# Patient Record
Sex: MALE | Race: WHITE | NOT HISPANIC OR LATINO | Employment: FULL TIME | URBAN - METROPOLITAN AREA
[De-identification: names, ages, dates, MRNs, and addresses within clinical notes are randomized per-mention and may not be internally consistent; named-entity substitution may affect disease eponyms.]

---

## 2023-12-04 PROBLEM — J03.90 ACUTE TONSILLITIS: Status: ACTIVE | Noted: 2023-12-04

## 2023-12-04 PROBLEM — I10 ESSENTIAL HYPERTENSION: Status: ACTIVE | Noted: 2023-12-04

## 2023-12-04 PROBLEM — R53.82 CHRONIC FATIGUE: Status: ACTIVE | Noted: 2023-12-04

## 2023-12-04 PROBLEM — F41.9 ANXIETY: Status: ACTIVE | Noted: 2023-12-04

## 2023-12-04 PROBLEM — J02.9 PHARYNGITIS: Status: ACTIVE | Noted: 2023-12-04

## 2023-12-04 PROBLEM — J45.909 ASTHMA (HHS-HCC): Status: ACTIVE | Noted: 2023-12-04

## 2023-12-04 PROBLEM — D72.819 LEUKOPENIA: Status: ACTIVE | Noted: 2023-12-04

## 2023-12-05 ENCOUNTER — OFFICE VISIT (OUTPATIENT)
Dept: PRIMARY CARE | Facility: CLINIC | Age: 43
End: 2023-12-05
Payer: COMMERCIAL

## 2023-12-05 VITALS
BODY MASS INDEX: 22.5 KG/M2 | OXYGEN SATURATION: 100 % | SYSTOLIC BLOOD PRESSURE: 162 MMHG | HEIGHT: 66 IN | HEART RATE: 97 BPM | WEIGHT: 140 LBS | TEMPERATURE: 97.8 F | DIASTOLIC BLOOD PRESSURE: 96 MMHG

## 2023-12-05 DIAGNOSIS — F41.8 SITUATIONAL ANXIETY: Primary | ICD-10-CM

## 2023-12-05 DIAGNOSIS — R03.0 ELEVATED BP WITHOUT DIAGNOSIS OF HYPERTENSION: ICD-10-CM

## 2023-12-05 PROCEDURE — 3077F SYST BP >= 140 MM HG: CPT | Performed by: INTERNAL MEDICINE

## 2023-12-05 PROCEDURE — 3080F DIAST BP >= 90 MM HG: CPT | Performed by: INTERNAL MEDICINE

## 2023-12-05 PROCEDURE — 1036F TOBACCO NON-USER: CPT | Performed by: INTERNAL MEDICINE

## 2023-12-05 PROCEDURE — 99213 OFFICE O/P EST LOW 20 MIN: CPT | Performed by: INTERNAL MEDICINE

## 2023-12-05 RX ORDER — LORAZEPAM 0.5 MG/1
0.5 TABLET ORAL DAILY PRN
Qty: 10 TABLET | Refills: 0 | Status: SHIPPED | OUTPATIENT
Start: 2023-12-05 | End: 2023-12-05 | Stop reason: SDUPTHER

## 2023-12-05 RX ORDER — BISMUTH SUBSALICYLATE 262 MG
1 TABLET,CHEWABLE ORAL DAILY
COMMUNITY

## 2023-12-05 ASSESSMENT — ENCOUNTER SYMPTOMS
OCCASIONAL FEELINGS OF UNSTEADINESS: 0
DEPRESSION: 0
LOSS OF SENSATION IN FEET: 0

## 2023-12-05 ASSESSMENT — PATIENT HEALTH QUESTIONNAIRE - PHQ9
2. FEELING DOWN, DEPRESSED OR HOPELESS: NOT AT ALL
1. LITTLE INTEREST OR PLEASURE IN DOING THINGS: NOT AT ALL
SUM OF ALL RESPONSES TO PHQ9 QUESTIONS 1 AND 2: 0

## 2023-12-05 ASSESSMENT — PAIN SCALES - GENERAL: PAINLEVEL: 0-NO PAIN

## 2023-12-05 NOTE — PROGRESS NOTES
[facility]: [facility]  PROGRESS NOTE      Aquilino Reyna is a 43 y.o. male that is presenting today for Anxiety.    Assessment/Plan   Diagnoses and all orders for this visit:  Situational anxiety  -     LORazepam (Ativan) 0.5 mg tablet; Take 1 tablet (0.5 mg) by mouth once daily as needed for anxiety.  Elevated BP without diagnosis of hypertension      Reviewed the patient previous VS- BP been normal but the HR was always above 100 today is 97     Discussed low salt diet and exercise / has FU with PCP next month  Subjective   Patient of SARAH is here for SV, here for Situational / Personal reasons that flared up his anxiety, been anxious timothy all his life.    Review of Systems   All pertinent POSITIVES as noted per HPI.  All other systems have been reviewed and are NEGATIVE and /or Noncontributory to this patient current visit or complaint.  Objective   Vitals:    12/05/23 1004   BP: (!) 162/96   Pulse: 97   Temp: 36.6 °C (97.8 °F)   SpO2: 100%      Body mass index is 22.6 kg/m².  Physical Exam  Vitals and nursing note reviewed.   Constitutional:       Appearance: Normal appearance.   HENT:      Head: Normocephalic and atraumatic.   Neck:      Vascular: No carotid bruit.   Cardiovascular:      Rate and Rhythm: Normal rate and regular rhythm.      Pulses: Normal pulses.      Heart sounds: Normal heart sounds.   Pulmonary:      Effort: Pulmonary effort is normal.      Breath sounds: Normal breath sounds.   Neurological:      Mental Status: She is alert.   Psychiatric:         Alert, cooperative, Anxious mood, normal affect and speech, avoiding eye contact, normal behavior and thoughts    Diagnostic Results   Lab Results   Component Value Date    GLUCOSE 97 06/17/2022    CALCIUM 9.7 06/17/2022     06/17/2022    K 4.3 06/17/2022    CO2 25 06/17/2022     06/17/2022    BUN 16 06/17/2022    CREATININE 1.1 06/17/2022     Lab Results   Component Value Date    ALT 23 06/17/2022    AST 21  "06/17/2022    ALKPHOS 59 06/17/2022    BILITOT 0.6 06/17/2022     Lab Results   Component Value Date    WBC 3.5 (L) 06/17/2022    HGB 15.2 06/17/2022    HCT 45.2 06/17/2022    MCV 90.4 06/17/2022     06/17/2022     Lab Results   Component Value Date    CHOL 171 06/17/2022     Lab Results   Component Value Date    HDL 54 06/17/2022     Lab Results   Component Value Date    LDLCALC 109 06/17/2022     Lab Results   Component Value Date    TRIG 42 06/17/2022     No components found for: \"CHOLHDL\"  Lab Results   Component Value Date    HGBA1C 5.0 06/17/2022     Other labs not included in the list above were reviewed either before or during this encounter.    History    History reviewed. No pertinent past medical history.  History reviewed. No pertinent surgical history.  No family history on file.  Social History     Socioeconomic History    Marital status: Unknown     Spouse name: Not on file    Number of children: Not on file    Years of education: Not on file    Highest education level: Not on file   Occupational History    Not on file   Tobacco Use    Smoking status: Never     Passive exposure: Never    Smokeless tobacco: Never   Vaping Use    Vaping Use: Never used   Substance and Sexual Activity    Alcohol use: Yes    Drug use: Never    Sexual activity: Not on file   Other Topics Concern    Not on file   Social History Narrative    Not on file     Social Determinants of Health     Financial Resource Strain: Not on file   Food Insecurity: Not on file   Transportation Needs: Not on file   Physical Activity: Not on file   Stress: Not on file   Social Connections: Not on file   Intimate Partner Violence: Not on file   Housing Stability: Not on file     Allergies   Allergen Reactions    House Dust Unknown     Current Outpatient Medications on File Prior to Visit   Medication Sig Dispense Refill    multivitamin tablet Take 1 tablet by mouth once daily.       No current facility-administered medications on file " prior to visit.     Immunization History   Administered Date(s) Administered    [employer] Purple Cap SARS-CoV-2 12/31/2021     Patient's medical history was reviewed and updated either before or during this encounter.       Neva Rosado MD

## 2023-12-06 RX ORDER — LORAZEPAM 0.5 MG/1
0.5 TABLET ORAL DAILY PRN
Qty: 10 TABLET | Refills: 0 | Status: SHIPPED | OUTPATIENT
Start: 2023-12-06 | End: 2024-03-08 | Stop reason: ALTCHOICE

## 2024-01-17 ENCOUNTER — APPOINTMENT (OUTPATIENT)
Dept: CARDIOLOGY | Facility: HOSPITAL | Age: 44
End: 2024-01-17
Payer: COMMERCIAL

## 2024-01-17 ENCOUNTER — APPOINTMENT (OUTPATIENT)
Dept: RADIOLOGY | Facility: HOSPITAL | Age: 44
End: 2024-01-17
Payer: COMMERCIAL

## 2024-01-17 ENCOUNTER — HOSPITAL ENCOUNTER (EMERGENCY)
Facility: HOSPITAL | Age: 44
Discharge: HOME | End: 2024-01-17
Attending: EMERGENCY MEDICINE
Payer: COMMERCIAL

## 2024-01-17 VITALS
WEIGHT: 141.54 LBS | DIASTOLIC BLOOD PRESSURE: 82 MMHG | HEIGHT: 66 IN | TEMPERATURE: 97.7 F | SYSTOLIC BLOOD PRESSURE: 166 MMHG | BODY MASS INDEX: 22.75 KG/M2 | HEART RATE: 91 BPM | OXYGEN SATURATION: 98 % | RESPIRATION RATE: 18 BRPM

## 2024-01-17 DIAGNOSIS — F41.9 ANXIETY: ICD-10-CM

## 2024-01-17 DIAGNOSIS — R07.89 ATYPICAL CHEST PAIN: Primary | ICD-10-CM

## 2024-01-17 LAB
ALBUMIN SERPL-MCNC: 5.6 G/DL (ref 3.5–5)
ALP BLD-CCNC: 89 U/L (ref 35–125)
ALT SERPL-CCNC: 19 U/L (ref 5–40)
ANION GAP SERPL CALC-SCNC: 13 MMOL/L
AST SERPL-CCNC: 18 U/L (ref 5–40)
BASOPHILS # BLD AUTO: 0.03 X10*3/UL (ref 0–0.1)
BASOPHILS NFR BLD AUTO: 0.6 %
BILIRUB SERPL-MCNC: 0.7 MG/DL (ref 0.1–1.2)
BUN SERPL-MCNC: 20 MG/DL (ref 8–25)
CALCIUM SERPL-MCNC: 10.7 MG/DL (ref 8.5–10.4)
CHLORIDE SERPL-SCNC: 97 MMOL/L (ref 97–107)
CO2 SERPL-SCNC: 28 MMOL/L (ref 24–31)
CREAT SERPL-MCNC: 0.9 MG/DL (ref 0.4–1.6)
EGFRCR SERPLBLD CKD-EPI 2021: >90 ML/MIN/1.73M*2
EOSINOPHIL # BLD AUTO: 0.08 X10*3/UL (ref 0–0.7)
EOSINOPHIL NFR BLD AUTO: 1.5 %
ERYTHROCYTE [DISTWIDTH] IN BLOOD BY AUTOMATED COUNT: 12.6 % (ref 11.5–14.5)
GLUCOSE SERPL-MCNC: 106 MG/DL (ref 65–99)
HCT VFR BLD AUTO: 51.9 % (ref 41–52)
HGB BLD-MCNC: 18 G/DL (ref 13.5–17.5)
IMM GRANULOCYTES # BLD AUTO: 0.02 X10*3/UL (ref 0–0.7)
IMM GRANULOCYTES NFR BLD AUTO: 0.4 % (ref 0–0.9)
LYMPHOCYTES # BLD AUTO: 1.44 X10*3/UL (ref 1.2–4.8)
LYMPHOCYTES NFR BLD AUTO: 27.3 %
MCH RBC QN AUTO: 30.1 PG (ref 26–34)
MCHC RBC AUTO-ENTMCNC: 34.7 G/DL (ref 32–36)
MCV RBC AUTO: 87 FL (ref 80–100)
MONOCYTES # BLD AUTO: 0.35 X10*3/UL (ref 0.1–1)
MONOCYTES NFR BLD AUTO: 6.6 %
NEUTROPHILS # BLD AUTO: 3.36 X10*3/UL (ref 1.2–7.7)
NEUTROPHILS NFR BLD AUTO: 63.6 %
NRBC BLD-RTO: 0 /100 WBCS (ref 0–0)
PLATELET # BLD AUTO: 221 X10*3/UL (ref 150–450)
POTASSIUM SERPL-SCNC: 4 MMOL/L (ref 3.4–5.1)
PROT SERPL-MCNC: 8.6 G/DL (ref 5.9–7.9)
RBC # BLD AUTO: 5.99 X10*6/UL (ref 4.5–5.9)
SODIUM SERPL-SCNC: 138 MMOL/L (ref 133–145)
TROPONIN T SERPL-MCNC: 6 NG/L
TROPONIN T SERPL-MCNC: 6 NG/L
TSH SERPL DL<=0.05 MIU/L-ACNC: 1.7 MIU/L (ref 0.27–4.2)
WBC # BLD AUTO: 5.3 X10*3/UL (ref 4.4–11.3)

## 2024-01-17 PROCEDURE — 36415 COLL VENOUS BLD VENIPUNCTURE: CPT | Performed by: EMERGENCY MEDICINE

## 2024-01-17 PROCEDURE — 93005 ELECTROCARDIOGRAM TRACING: CPT

## 2024-01-17 PROCEDURE — 84443 ASSAY THYROID STIM HORMONE: CPT | Performed by: EMERGENCY MEDICINE

## 2024-01-17 PROCEDURE — 96374 THER/PROPH/DIAG INJ IV PUSH: CPT

## 2024-01-17 PROCEDURE — 2500000004 HC RX 250 GENERAL PHARMACY W/ HCPCS (ALT 636 FOR OP/ED): Performed by: EMERGENCY MEDICINE

## 2024-01-17 PROCEDURE — 71046 X-RAY EXAM CHEST 2 VIEWS: CPT

## 2024-01-17 PROCEDURE — 84484 ASSAY OF TROPONIN QUANT: CPT | Performed by: EMERGENCY MEDICINE

## 2024-01-17 PROCEDURE — 99284 EMERGENCY DEPT VISIT MOD MDM: CPT | Performed by: EMERGENCY MEDICINE

## 2024-01-17 PROCEDURE — 80053 COMPREHEN METABOLIC PANEL: CPT | Performed by: EMERGENCY MEDICINE

## 2024-01-17 PROCEDURE — 85025 COMPLETE CBC W/AUTO DIFF WBC: CPT | Performed by: EMERGENCY MEDICINE

## 2024-01-17 RX ORDER — LORAZEPAM 1 MG/1
0.5 TABLET ORAL 3 TIMES DAILY PRN
Qty: 10 TABLET | Refills: 0 | Status: SHIPPED | OUTPATIENT
Start: 2024-01-17 | End: 2024-03-08 | Stop reason: ALTCHOICE

## 2024-01-17 RX ORDER — LORAZEPAM 2 MG/ML
0.5 INJECTION INTRAMUSCULAR ONCE
Status: COMPLETED | OUTPATIENT
Start: 2024-01-17 | End: 2024-01-17

## 2024-01-17 RX ADMIN — SODIUM CHLORIDE 1000 ML: 900 INJECTION, SOLUTION INTRAVENOUS at 13:41

## 2024-01-17 RX ADMIN — LORAZEPAM 0.5 MG: 2 INJECTION, SOLUTION INTRAMUSCULAR; INTRAVENOUS at 12:55

## 2024-01-17 ASSESSMENT — COLUMBIA-SUICIDE SEVERITY RATING SCALE - C-SSRS
2. HAVE YOU ACTUALLY HAD ANY THOUGHTS OF KILLING YOURSELF?: NO
6. HAVE YOU EVER DONE ANYTHING, STARTED TO DO ANYTHING, OR PREPARED TO DO ANYTHING TO END YOUR LIFE?: NO
1. IN THE PAST MONTH, HAVE YOU WISHED YOU WERE DEAD OR WISHED YOU COULD GO TO SLEEP AND NOT WAKE UP?: NO

## 2024-01-17 ASSESSMENT — PAIN - FUNCTIONAL ASSESSMENT: PAIN_FUNCTIONAL_ASSESSMENT: 0-10

## 2024-01-17 ASSESSMENT — PAIN SCALES - GENERAL: PAINLEVEL_OUTOF10: 1

## 2024-01-17 NOTE — DISCHARGE INSTRUCTIONS
Please follow-up with a cardiologist within a minute provided.  You can use Ativan as needed if you think you are having a panic attack but if you have any new symptoms that arise he may come back to the emergency room for repeat evaluation.

## 2024-01-17 NOTE — ED PROVIDER NOTES
EMERGENCY DEPARTMENT ENCOUNTER      [ ] CODE STEMI [ ] CODE Neuro [ ] CODE Yellow [ ] Modified Trauma [ ] CODE Blue      CHIEF COMPLAINT      Chief Complaint   Patient presents with    Chest Pain     Pt complains of intermittent chest pain for a couple days.  States it is worse in the morning.  Sometimes feels sob. Was seen at urgent care and directed to come to the ED.       Mode of Arrival:Car  Primary Care Provider: Ty Matt MD  Medical Record Number: [id number]      History obtained by: Patient  Limited by nothing  Time seen: 2:15 PM    QUALITY MEASURES   PPE Utilized: N95 with goggles and gloves      HPI        Aquilino Reyna is a 43 y.o. male with a history of reported anxiety per the records, and and question of hypertension but not currently on any medications, only family history of cardiac disease and late age, comes to emergency room stating the last couple days when he wakes up in the morning seemingly at rest he has chest pain that last for about an hour dissipates on its own but not accompanied by any other symptoms such as palpitations diaphoresis or syncope nor shortness of breath or lightheadedness.  States that in the past Ativan does help but is not taking anything in the last couple days.  Does state that he has general stressors with work and home.  Does not smoke drink or use any drugs aside from THC, he took once a few days ago.  States that otherwise this has not happened before.  Describes it as dull and across chest but nonradiating and irrespective physician no more painful with deep breath and has not been sick in the last month.  Did not take any medications for relief.  States that currently he has residual pain but it is only 1 out of 10.  No other complaints.      Patient otherwise denies fever, chills, n/v/d, shortness of breath, sore throat, cough, rhinorrhea, abdominal pain, dysuria, hematuria, swollen extremities or skin changes, hematemesis, hematochezia, melena or any other  accompanying symptoms of late.      The patient has no other complaints at this time.               PAST MEDICAL HISTORY    History reviewed. No pertinent past medical history.      I have personally reviewed the patient's past medical history in the records.  Gerson Valencia MD    SURGICAL HISTORY    History reviewed. No pertinent surgical history.    I have personally reviewed the patient's past surgical history in the records.  Gerson Valencia MD    CURRENT MEDICATIONS    I have reviewed the patient’s medications.   Please see nursing and pharmacy records for the most up to date list.     [email or web address]    ALLERGIES    Allergies   Allergen Reactions    House Dust Unknown       I have personally reviewed the patient's past history of allergies in the records.  Gerson Valencia MD    FAMILY HISTORY    No family history on file.    I have personally reviewed the patient's family history in the records.  Gerson Valencia MD    SOCIAL HISTORY    Social History     Socioeconomic History    Marital status:      Spouse name: None    Number of children: None    Years of education: None    Highest education level: None   Occupational History    None   Tobacco Use    Smoking status: Never     Passive exposure: Never    Smokeless tobacco: Never   Vaping Use    Vaping Use: Never used   Substance and Sexual Activity    Alcohol use: Yes    Drug use: Never    Sexual activity: None   Other Topics Concern    None   Social History Narrative    None     Social Determinants of Health     Financial Resource Strain: Not on file   Food Insecurity: Not on file   Transportation Needs: Not on file   Physical Activity: Not on file   Stress: Not on file   Social Connections: Not on file   Intimate Partner Violence: Not on file   Housing Stability: Not on file         I have personally reviewed the patient's social history in the records.  Gerson Valencia MD    REVIEW OF SYSTEMS      14 point ROS was reviewed and negative except as noted above in  "HPI.      PHYSICAL EXAM    VITAL SIGNS:    BP (!) 188/112   Pulse 100   Temp 36.5 °C (97.7 °F)   Resp 16   Ht 1.676 m (5' 6\")   Wt 64.2 kg (141 lb 8.6 oz)   SpO2 99%   BMI 22.84 kg/m²    Review EMR for vital signs  Nursing note and vitals reviewed.    Constitutional:  Alert and oriented, well-developed, well-nourished, appears stated age, non-toxic appearing  HENT:  Normocephalic, atraumatic, bilateral external ears normal, oropharynx moist, Nose normal.  Neck: normal range of motion, no tenderness, supple, no stridor.  Eyes:  PERRL, EOMI, conjunctiva normal, no discharge.   Cardiovascular:  Normal heart rate, normal rhythm, no murmurs, no rubs, no gallops.   Respiratory:  Normal breath sounds, no respiratory distress, no wheezing, no chest wall tenderness.   GI:  Bowel sounds normal, soft, no tenderness, no rebound or guarding, no distention, no masses pulsatile or otherwise   (any female  exam was done with female chaperone present):   Deferred  Integument:  Warm, dry, no erythema, no rash, no edema.   Back:  No midline tenderness, no CVA tenderness.   Musculoskeletal:  Intact distal pulses, no tenderness, no cyanosis, no clubbing, with capillary refill less than 2 seconds. Good range of motion in all major joints. No tenderness to palpation or major deformities noted.    Neurologic:  Alert & oriented x 3, normal motor function, normal sensory function, no focal deficits noted. Cranial nerves II-XII intact.  Psychiatric:  Affect normal, judgment normal, mood normal.     EKG  Performed at      1232  , HR of    87,     NSR, NAD, no sign of STEMI or NSTEMI, no Q wave or T wave abnormality noted.    Reviewed and interpreted by me at time performed      Reviewed and interpreted by me, Gerson Valencia MD        CARDIAC MONITORING    Cardiac monitor reveals normal sinus rhythm as interpreted by me.   Cardiac monitor was ordered secondary to patient's history of chest " pain/palpitations/near-syncope/syncope/sob/stroke and to monitor the patient for dysrhythmia.      RADIOLOGY  XR chest 2 views    (Results Pending)       All Imaging studies evaluated and interpreted by ED physician except when noted otherwise.    ED PROVIDER INTERPRETATION (XRAYS ONLY):       *I have interpreted the x-ray real-time in the ED myself, and made a clinical decision on it prior to the formal radiology reading.    Gerson Valencia M.D.    RADIOLOGIST IMPRESSION (U/S, CT, MRI):   XR chest 2 views    (Results Pending)         PERTINENT LABS    Please refer to the chart for all lab work and to MDM for relevant discussion.      PROCEDURE    None (procdoc)    ED COURSE & MEDICAL DECISION MAKING    Pertinent Labs & Imaging studies reviewed. (See chart for details)    HEART SCORE For Chest Pain Risk  HEART SCORE:  Risk stratification scoring that has been prospectively and retrospectively validated for risk stratifying patients at risk for coronary artery disease and the rate of Major Adverse Cardiac Events (MACE) defined as myocardial infarction, positive catheterization or intervention, CABG, or Death.     Category Item Points Patient Score   History Highly Suspicious 2 0    Moderately Suspicious 1     Slightly Suspicious 0    ECG Significant ST-deviation 2 0    Nonspecific repolarization  1     Normal 0    Age >65 Years Old 2 0    >45 and <65 Years Old 1     <45 years old 0    Risk Factors >=3 risk factors or history or known disease 2 1    1 or 2 risk factors 1     No risk factors known 0    Troponin >= 3 times the normal limit 2 0    >1 and <3 times the normal limit 1     <=1 times the normal limit 0    Total Score: 1     Risk Factors for Atherosclerotic Disease: Hypercholesterolemia, Smoking, Hypertension, Diabetes, Positive Family History, Obesity    Score Interpretation:   The initial study classified three categories of risk:  0-3 points = 2.5% MACE over next 6 weeks, consider close outpatient workup  4-6  points = 20.3% MACE over next 6 weeks, consider inpatient observation for testing  7-10 points = 72.7% MACE over next 6 weeks, consider early invasive strategies    Patient Interpretation:  This patient is considered lowRisk based upon their heart score.    MDM:    Assessment: Aquilino Reyna is a 43 y.o.male who presents to the ED with atypical chest pain but with a low heart score and told patient that I would still get serial troponins EKG chest x-ray CBC chemistry comprehensive metabolic panel TSH and per his request would also give low-dose Ativan to see if this could potentially simply be a panic attack but will still rule out ACS otherwise.  Do not suspect PE.  Also noted the patient has high blood pressure we will recheck blood pressure after Ativan is given the rest of workup is resulted with a delta troponin as well.  Patient states that the chest pain started about 7 hours ago but we will still obtain at least a delta troponin.  Patient understands and agrees with plan        Prior records in EPIC reviewed by me.     2023 Coding Requirements:  --Independent historian(s):    see HPI  --Review of prior records:    EHR reviewed   --Relevant comorbidities:    see records  --Social determinants of health:          CHEST PAIN I have considered the following conditions in my assessment of   this patient's condition:  Arm pain, chest pain, aortic dissection, cholecystitis,   coronary syndrome acute, pericarditis, myocarditis, tamponade, esophageal   rupture, herpes zoster or shingles, myocardial infarction acute, pneumonia,   pneumothorax, pulmonary embolus, chest wall pain, costochondritis.    CBC within normal limits.  With hemoglobin slightly elevated.  This is new compared to labs drawn about 1 and half years ago.    Chemistry and LFTs otherwise relatively unremarkable with only calcium slightly elevated.  NS ordered for patient    Troponin otherwise within normal limits and wet read of chest x-ray by my read  "does not show any acute findings.    Spoke to patient who has no pain at this time and states that he feels fine.  Vital signs stable and repeat blood pressure was 140/88 with heart rate in 78.  After discussion with patient family agreed to repeat troponin at 2:35 PM followed by discharge with cardiology name and number for follow-up.    Repeat troponin was negative the patient blood pressure still within normal limits and after discussion patient who is feeling better agrees to low amount of lorazepam for home name and number for cardiology for follow-up with strict return precautions.  Patient and family understand and agree with plan.      ED VITALS  Vitals:    01/17/24 1226   BP: (!) 188/112   Pulse: 100   Resp: 16   Temp: 36.5 °C (97.7 °F)   SpO2: 99%   Weight: 64.2 kg (141 lb 8.6 oz)   Height: 1.676 m (5' 6\")       BP  Min: 188/112  Max: 188/112    As part of the 2022 [facility] reporting requirements, the following measures have been reviewed and documented:    None     1. Atypical chest pain        Diagnoses as of 01/17/24 1502   Atypical chest pain   Anxiety         DISPOSITION       DISCHARGE.  The patient is discharged back to their place of residence.  Discharge diagnosis, instructions and plan were discussed and understood. At the time of discharge the patient was comfortable and was in no apparent distress. Patient is aware of diagnostic uncertainty and was notified though testing is negative here, there is a very small chance that pathology may be missed.  The patient understands these risks and the patient /family understood to return immediately to the emergency department if the symptoms worsen or if they have any additional concerns.    DISCHARGE MEDICATIONS  New Prescriptions    No medications on file       FOLLOW UP  No follow-up provider specified.    Gerson Valencia    This note was created with the assistance of voice recognition technology.  While attempts were made to ensure accuracy, " mis-transcription may be present due to limitations in the software.        Electronically signed by MD Gerson Berger MD  01/17/24 3181

## 2024-01-21 LAB
ATRIAL RATE: 87 BPM
P AXIS: 74 DEGREES
P OFFSET: 206 MS
P ONSET: 156 MS
PR INTERVAL: 122 MS
Q ONSET: 217 MS
QRS COUNT: 14 BEATS
QRS DURATION: 96 MS
QT INTERVAL: 338 MS
QTC CALCULATION(BAZETT): 406 MS
QTC FREDERICIA: 382 MS
R AXIS: 60 DEGREES
T AXIS: 39 DEGREES
T OFFSET: 386 MS
VENTRICULAR RATE: 87 BPM

## 2024-01-25 ENCOUNTER — APPOINTMENT (OUTPATIENT)
Dept: PRIMARY CARE | Facility: CLINIC | Age: 44
End: 2024-01-25
Payer: COMMERCIAL

## 2024-01-25 ENCOUNTER — TELEPHONE (OUTPATIENT)
Dept: PRIMARY CARE | Facility: CLINIC | Age: 44
End: 2024-01-25
Payer: COMMERCIAL

## 2024-02-26 PROBLEM — R07.89 ATYPICAL CHEST PAIN: Status: ACTIVE | Noted: 2024-02-26

## 2024-02-26 PROBLEM — R03.0 ELEVATED BLOOD PRESSURE READING WITHOUT DIAGNOSIS OF HYPERTENSION: Status: ACTIVE | Noted: 2024-02-26

## 2024-03-08 ENCOUNTER — OFFICE VISIT (OUTPATIENT)
Dept: PRIMARY CARE | Facility: CLINIC | Age: 44
End: 2024-03-08
Payer: COMMERCIAL

## 2024-03-08 VITALS
OXYGEN SATURATION: 99 % | DIASTOLIC BLOOD PRESSURE: 72 MMHG | BODY MASS INDEX: 22.52 KG/M2 | HEART RATE: 96 BPM | WEIGHT: 139.5 LBS | TEMPERATURE: 97.5 F | SYSTOLIC BLOOD PRESSURE: 138 MMHG

## 2024-03-08 DIAGNOSIS — Z23 ENCOUNTER FOR IMMUNIZATION: ICD-10-CM

## 2024-03-08 DIAGNOSIS — Z01.89 ENCOUNTER FOR ROUTINE LABORATORY TESTING: ICD-10-CM

## 2024-03-08 DIAGNOSIS — R73.9 HYPERGLYCEMIA: ICD-10-CM

## 2024-03-08 DIAGNOSIS — E55.9 VITAMIN D DEFICIENCY: ICD-10-CM

## 2024-03-08 DIAGNOSIS — J45.20 MILD INTERMITTENT ASTHMA WITHOUT COMPLICATION (HHS-HCC): ICD-10-CM

## 2024-03-08 DIAGNOSIS — E78.2 MIXED HYPERLIPIDEMIA: ICD-10-CM

## 2024-03-08 DIAGNOSIS — Z00.00 ANNUAL PHYSICAL EXAM: Primary | ICD-10-CM

## 2024-03-08 DIAGNOSIS — F41.9 ANXIETY: ICD-10-CM

## 2024-03-08 DIAGNOSIS — R53.82 CHRONIC FATIGUE: ICD-10-CM

## 2024-03-08 DIAGNOSIS — I10 ESSENTIAL HYPERTENSION: ICD-10-CM

## 2024-03-08 PROBLEM — D72.819 LEUKOPENIA: Status: RESOLVED | Noted: 2023-12-04 | Resolved: 2024-03-08

## 2024-03-08 PROBLEM — R07.89 ATYPICAL CHEST PAIN: Status: RESOLVED | Noted: 2024-02-26 | Resolved: 2024-03-08

## 2024-03-08 PROBLEM — J03.90 ACUTE TONSILLITIS: Status: RESOLVED | Noted: 2023-12-04 | Resolved: 2024-03-08

## 2024-03-08 PROBLEM — J02.9 PHARYNGITIS: Status: RESOLVED | Noted: 2023-12-04 | Resolved: 2024-03-08

## 2024-03-08 PROCEDURE — 1036F TOBACCO NON-USER: CPT | Performed by: STUDENT IN AN ORGANIZED HEALTH CARE EDUCATION/TRAINING PROGRAM

## 2024-03-08 PROCEDURE — 3075F SYST BP GE 130 - 139MM HG: CPT | Performed by: STUDENT IN AN ORGANIZED HEALTH CARE EDUCATION/TRAINING PROGRAM

## 2024-03-08 PROCEDURE — 3078F DIAST BP <80 MM HG: CPT | Performed by: STUDENT IN AN ORGANIZED HEALTH CARE EDUCATION/TRAINING PROGRAM

## 2024-03-08 PROCEDURE — 99214 OFFICE O/P EST MOD 30 MIN: CPT | Performed by: STUDENT IN AN ORGANIZED HEALTH CARE EDUCATION/TRAINING PROGRAM

## 2024-03-08 PROCEDURE — 99396 PREV VISIT EST AGE 40-64: CPT | Performed by: STUDENT IN AN ORGANIZED HEALTH CARE EDUCATION/TRAINING PROGRAM

## 2024-03-08 RX ORDER — ALBUTEROL SULFATE 90 UG/1
2 AEROSOL, METERED RESPIRATORY (INHALATION) EVERY 4 HOURS PRN
Qty: 8 G | Refills: 1 | Status: SHIPPED | OUTPATIENT
Start: 2024-03-08

## 2024-03-08 RX ORDER — SERTRALINE HYDROCHLORIDE 25 MG/1
25 TABLET, FILM COATED ORAL DAILY
Qty: 30 TABLET | Refills: 1 | Status: SHIPPED | OUTPATIENT
Start: 2024-03-08 | End: 2024-05-07

## 2024-03-08 ASSESSMENT — ENCOUNTER SYMPTOMS
EYES NEGATIVE: 1
RESPIRATORY NEGATIVE: 1
GASTROINTESTINAL NEGATIVE: 1
MUSCULOSKELETAL NEGATIVE: 1
OCCASIONAL FEELINGS OF UNSTEADINESS: 0
ALLERGIC/IMMUNOLOGIC NEGATIVE: 1
LOSS OF SENSATION IN FEET: 0
CARDIOVASCULAR NEGATIVE: 1
SLEEP DISTURBANCE: 1
NERVOUS/ANXIOUS: 1
DEPRESSION: 0
HEMATOLOGIC/LYMPHATIC NEGATIVE: 1
ENDOCRINE NEGATIVE: 1
NEUROLOGICAL NEGATIVE: 1
CONSTITUTIONAL NEGATIVE: 1

## 2024-03-08 ASSESSMENT — PATIENT HEALTH QUESTIONNAIRE - PHQ9
SUM OF ALL RESPONSES TO PHQ9 QUESTIONS 1 AND 2: 0
2. FEELING DOWN, DEPRESSED OR HOPELESS: NOT AT ALL
1. LITTLE INTEREST OR PLEASURE IN DOING THINGS: NOT AT ALL

## 2024-03-08 ASSESSMENT — PAIN SCALES - GENERAL: PAINLEVEL: 0-NO PAIN

## 2024-03-08 NOTE — PROGRESS NOTES
[facility]: [facility]  PHYSICAL EXAM      Aquilino Reyna is a 43 y.o. male that is presenting today for Annual Exam.    Assessment/Plan   Diagnoses and all orders for this visit:  Annual physical exam  Encounter for routine laboratory testing  -     CBC and Auto Differential; Future  -     Basic Metabolic Panel; Future  -     Hepatic Function Panel; Future  -     Lipid Panel; Future  -     Hemoglobin A1C; Future  -     Vitamin D 25-Hydroxy,Total (for eval of Vitamin D levels); Future  -     TSH with reflex to Free T4 if abnormal; Future  -     Testosterone, total and free; Future  -     Vitamin B12; Future  -     Folate; Future  Mixed hyperlipidemia  -     Lipid Panel; Future  Vitamin D deficiency  -     Vitamin D 25-Hydroxy,Total (for eval of Vitamin D levels); Future  Hyperglycemia  -     Hemoglobin A1C; Future  Encounter for immunization  Mild intermittent asthma without complication  -     albuterol (Ventolin HFA) 90 mcg/actuation inhaler; Inhale 2 puffs every 4 hours if needed for wheezing or shortness of breath.  Essential hypertension  -     Follow Up In Primary Care; Future  Chronic fatigue  -     CBC and Auto Differential; Future  -     Testosterone, total and free; Future  -     Vitamin B12; Future  -     Folate; Future  Anxiety  -     TSH with reflex to Free T4 if abnormal; Future  -     sertraline (Zoloft) 25 mg tablet; Take 1 tablet (25 mg) by mouth once daily.  -     Follow Up In Primary Care; Future  -     Follow Up In Primary Care; Future    Discussed routine and/or preventative care with the patient as outlined below:  - Labwork:   - Patient appears to be due for labwork. Ordered today.    - Will order labwork for the patient to get one week prior to the next appointment.  - Imaging:   - Patient does not appear to be due for routine imaging at this time.  - Immunizations:   - Discussed the tetanus (TDAP) booster.   - Discussed seasonal immunizations, including the influenza and  COVID-19 immunizations.   - Significant medication and problem list reconciliation done today.  - Patient noting that his anxiety has been worse lately. Counseled lifestyle modifications. Counseled benefits of both cognitive behavioral therapy (CBT) as well as medication. Plan as above.    Subjective   - The patient otherwise feels well and denies any acute symptoms or concerns at this time.  - The patient denies any changes or progression of their chronic medical problems.  - The patient denies any problems or concerns with their medications.      Review of Systems   Constitutional: Negative.    HENT: Negative.     Eyes: Negative.    Respiratory: Negative.     Cardiovascular: Negative.    Gastrointestinal: Negative.    Endocrine: Negative.    Genitourinary: Negative.    Musculoskeletal: Negative.    Skin: Negative.    Allergic/Immunologic: Negative.    Neurological: Negative.    Hematological: Negative.    Psychiatric/Behavioral:  Positive for sleep disturbance. The patient is nervous/anxious.    All other systems reviewed and are negative.     Objective   Vitals:    03/08/24 1503   BP: 138/72   Pulse: 96   Temp: 36.4 °C (97.5 °F)   SpO2: 99%     Body mass index is 22.52 kg/m².  Physical Exam  Vitals and nursing note reviewed.   Constitutional:       General: He is not in acute distress.     Appearance: Normal appearance. He is not ill-appearing.   HENT:      Head: Normocephalic and atraumatic.      Right Ear: Tympanic membrane, ear canal and external ear normal. There is no impacted cerumen.      Left Ear: Tympanic membrane, ear canal and external ear normal. There is no impacted cerumen.      Nose: Nose normal.      Mouth/Throat:      Mouth: Mucous membranes are moist.      Pharynx: Oropharynx is clear. No oropharyngeal exudate or posterior oropharyngeal erythema.   Eyes:      General: No scleral icterus.        Right eye: No discharge.         Left eye: No discharge.      Extraocular Movements: Extraocular  movements intact.      Conjunctiva/sclera: Conjunctivae normal.      Pupils: Pupils are equal, round, and reactive to light.   Neck:      Vascular: No carotid bruit.   Cardiovascular:      Rate and Rhythm: Normal rate and regular rhythm.      Pulses: Normal pulses.      Heart sounds: Normal heart sounds. No murmur heard.     No friction rub. No gallop.   Pulmonary:      Effort: Pulmonary effort is normal. No respiratory distress.      Breath sounds: Normal breath sounds.   Abdominal:      General: Abdomen is flat. Bowel sounds are normal.      Palpations: Abdomen is soft.      Tenderness: There is no abdominal tenderness.      Hernia: No hernia is present.   Musculoskeletal:         General: No swelling. Normal range of motion.      Cervical back: Normal range of motion.   Lymphadenopathy:      Cervical: No cervical adenopathy.   Skin:     General: Skin is warm and dry.      Coloration: Skin is not jaundiced.      Findings: No rash.   Neurological:      General: No focal deficit present.      Mental Status: He is alert and oriented to person, place, and time. Mental status is at baseline.   Psychiatric:         Mood and Affect: Mood normal.         Behavior: Behavior normal.       Diagnostic Results   Lab Results   Component Value Date    GLUCOSE 106 (H) 01/17/2024    CALCIUM 10.7 (H) 01/17/2024     01/17/2024    K 4.0 01/17/2024    CO2 28 01/17/2024    CL 97 01/17/2024    BUN 20 01/17/2024    CREATININE 0.90 01/17/2024     Lab Results   Component Value Date    ALT 19 01/17/2024    AST 18 01/17/2024    ALKPHOS 89 01/17/2024    BILITOT 0.7 01/17/2024     Lab Results   Component Value Date    WBC 5.3 01/17/2024    HGB 18.0 (H) 01/17/2024    HCT 51.9 01/17/2024    MCV 87 01/17/2024     01/17/2024     Lab Results   Component Value Date    CHOL 171 06/17/2022     Lab Results   Component Value Date    HDL 54 06/17/2022     Lab Results   Component Value Date    LDLCALC 109 06/17/2022     Lab Results  "  Component Value Date    TRIG 42 06/17/2022     No components found for: \"CHOLHDL\"  Lab Results   Component Value Date    HGBA1C 5.0 06/17/2022     Other labs not included in the list above were reviewed either before or during this encounter.    History   History reviewed. No pertinent past medical history.  History reviewed. No pertinent surgical history.  No family history on file.  Social History     Socioeconomic History    Marital status:      Spouse name: Not on file    Number of children: Not on file    Years of education: Not on file    Highest education level: Not on file   Occupational History    Not on file   Tobacco Use    Smoking status: Never     Passive exposure: Never    Smokeless tobacco: Never   Vaping Use    Vaping Use: Never used   Substance and Sexual Activity    Alcohol use: Yes    Drug use: Never    Sexual activity: Not on file   Other Topics Concern    Not on file   Social History Narrative    Not on file     Social Determinants of Health     Financial Resource Strain: Not on file   Food Insecurity: Not on file   Transportation Needs: Not on file   Physical Activity: Not on file   Stress: Not on file   Social Connections: Not on file   Intimate Partner Violence: Not on file   Housing Stability: Not on file     Allergies   Allergen Reactions    Horse Dander Unknown    House Dust Unknown     Current Outpatient Medications on File Prior to Visit   Medication Sig Dispense Refill    multivitamin tablet Take 1 tablet by mouth once daily.      [DISCONTINUED] LORazepam (Ativan) 0.5 mg tablet Take 1 tablet (0.5 mg) by mouth once daily as needed for anxiety. 10 tablet 0    [DISCONTINUED] LORazepam (Ativan) 1 mg tablet Take 0.5 tablets (0.5 mg) by mouth 3 times a day as needed for anxiety for up to 5 days. 10 tablet 0     No current facility-administered medications on file prior to visit.     Immunization History   Administered Date(s) Administered    Influenza, Unspecified 01/12/2011    " Influenza, seasonal, injectable 01/15/2013    Pfizer Purple Cap SARS-CoV-2 12/31/2021     Patient's medical history was reviewed and updated either before or during this encounter.       Ty Matt MD

## 2024-03-08 NOTE — PATIENT INSTRUCTIONS
Diagnoses and all orders for this visit:  Annual physical exam  Encounter for routine laboratory testing  -     CBC and Auto Differential; Future  -     Basic Metabolic Panel; Future  -     Hepatic Function Panel; Future  -     Lipid Panel; Future  -     Hemoglobin A1C; Future  -     Vitamin D 25-Hydroxy,Total (for eval of Vitamin D levels); Future  -     TSH with reflex to Free T4 if abnormal; Future  -     Testosterone, total and free; Future  -     Vitamin B12; Future  -     Folate; Future  Mixed hyperlipidemia  -     Lipid Panel; Future  Vitamin D deficiency  -     Vitamin D 25-Hydroxy,Total (for eval of Vitamin D levels); Future  Hyperglycemia  -     Hemoglobin A1C; Future  Encounter for immunization  Mild intermittent asthma without complication  -     albuterol (Ventolin HFA) 90 mcg/actuation inhaler; Inhale 2 puffs every 4 hours if needed for wheezing or shortness of breath.  Essential hypertension  -     Follow Up In Primary Care; Future  Chronic fatigue  -     CBC and Auto Differential; Future  -     Testosterone, total and free; Future  -     Vitamin B12; Future  -     Folate; Future  Anxiety  -     TSH with reflex to Free T4 if abnormal; Future  -     sertraline (Zoloft) 25 mg tablet; Take 1 tablet (25 mg) by mouth once daily.  -     Follow Up In Primary Care; Future  -     Follow Up In Primary Care; Future    Discussed routine and/or preventative care with the patient as outlined below:  - Labwork:   - Patient appears to be due for labwork. Ordered today.    - Will order labwork for the patient to get one week prior to the next appointment.  - Imaging:   - Patient does not appear to be due for routine imaging at this time.  - Immunizations:   - Discussed the tetanus (TDAP) booster.   - Discussed seasonal immunizations, including the influenza and COVID-19 immunizations.   - Significant medication and problem list reconciliation done today.  - Patient noting that his anxiety has been worse lately.  Counseled lifestyle modifications. Counseled benefits of both cognitive behavioral therapy (CBT) as well as medication. Plan as above.

## 2024-04-16 ENCOUNTER — APPOINTMENT (OUTPATIENT)
Dept: CARDIOLOGY | Facility: CLINIC | Age: 44
End: 2024-04-16
Payer: COMMERCIAL

## 2024-06-04 ENCOUNTER — OFFICE VISIT (OUTPATIENT)
Dept: CARDIOLOGY | Facility: CLINIC | Age: 44
End: 2024-06-04
Payer: COMMERCIAL

## 2024-06-04 VITALS
HEART RATE: 80 BPM | OXYGEN SATURATION: 100 % | WEIGHT: 138.1 LBS | SYSTOLIC BLOOD PRESSURE: 158 MMHG | DIASTOLIC BLOOD PRESSURE: 86 MMHG | HEIGHT: 66 IN | BODY MASS INDEX: 22.19 KG/M2

## 2024-06-04 DIAGNOSIS — I51.7 LVH (LEFT VENTRICULAR HYPERTROPHY): Primary | ICD-10-CM

## 2024-06-04 DIAGNOSIS — R00.2 PALPITATIONS: ICD-10-CM

## 2024-06-04 LAB
ATRIAL RATE: 80 BPM
P AXIS: 68 DEGREES
P OFFSET: 210 MS
P ONSET: 153 MS
PR INTERVAL: 130 MS
Q ONSET: 218 MS
QRS COUNT: 13 BEATS
QRS DURATION: 98 MS
QT INTERVAL: 358 MS
QTC CALCULATION(BAZETT): 412 MS
QTC FREDERICIA: 394 MS
R AXIS: 52 DEGREES
T AXIS: 5 DEGREES
T OFFSET: 397 MS
VENTRICULAR RATE: 80 BPM

## 2024-06-04 PROCEDURE — 99214 OFFICE O/P EST MOD 30 MIN: CPT | Performed by: INTERNAL MEDICINE

## 2024-06-04 PROCEDURE — 93010 ELECTROCARDIOGRAM REPORT: CPT | Performed by: INTERNAL MEDICINE

## 2024-06-04 PROCEDURE — 3079F DIAST BP 80-89 MM HG: CPT | Performed by: INTERNAL MEDICINE

## 2024-06-04 PROCEDURE — 3077F SYST BP >= 140 MM HG: CPT | Performed by: INTERNAL MEDICINE

## 2024-06-04 PROCEDURE — 1036F TOBACCO NON-USER: CPT | Performed by: INTERNAL MEDICINE

## 2024-06-04 PROCEDURE — 93005 ELECTROCARDIOGRAM TRACING: CPT | Performed by: INTERNAL MEDICINE

## 2024-06-04 PROCEDURE — 99204 OFFICE O/P NEW MOD 45 MIN: CPT | Performed by: INTERNAL MEDICINE

## 2024-06-04 RX ORDER — LORAZEPAM 0.5 MG/1
0.5 TABLET ORAL EVERY 6 HOURS PRN
COMMUNITY

## 2024-06-04 ASSESSMENT — ENCOUNTER SYMPTOMS
OCCASIONAL FEELINGS OF UNSTEADINESS: 0
DEPRESSION: 0
LOSS OF SENSATION IN FEET: 0

## 2024-06-04 ASSESSMENT — PAIN SCALES - GENERAL: PAINLEVEL: 0-NO PAIN

## 2024-06-04 NOTE — PROGRESS NOTES
"Primary Care Physician: Ty Matt MD  Date of Visit: 06/04/2024  4:15 PM EDT  Location of visit: [facility]     Chief Complaint:   Chief Complaint   Patient presents with    New Patient Visit    Hypertension     HPI / Summary:   Aquilino Reyna is a 43 y.o. male presents for atypical chest pain    ROS    Medical History:   He has no past medical history on file.  Surgical Hx:   He has no past surgical history on file.   Social Hx:   He reports that he has never smoked. He has never been exposed to tobacco smoke. He has never used smokeless tobacco. He reports current alcohol use. He reports that he does not use drugs.  Family Hx:   His family history is not on file.   Allergies:  Allergies   Allergen Reactions    Horse Dander Unknown    House Dust Unknown     Outpatient Medications:  Current Outpatient Medications   Medication Instructions    albuterol (Ventolin HFA) 90 mcg/actuation inhaler 2 puffs, inhalation, Every 4 hours PRN    LORazepam (ATIVAN) 0.5 mg, oral, Every 6 hours PRN    multivitamin tablet 1 tablet, oral, Daily    sertraline (ZOLOFT) 25 mg, oral, Daily     Physical Exam:  Vitals:    06/04/24 1601 06/04/24 1637   BP: 180/80 158/86   BP Location: Right arm    Patient Position: Sitting    BP Cuff Size: Adult    Pulse: 105 80   SpO2: 100%    Weight: 62.6 kg (138 lb 1.6 oz)    Height: 1.676 m (5' 6\")      Wt Readings from Last 5 Encounters:   06/04/24 62.6 kg (138 lb 1.6 oz)   03/08/24 63.3 kg (139 lb 8 oz)   01/17/24 64.2 kg (141 lb 8.6 oz)   12/05/23 63.5 kg (140 lb)   10/31/22 62.1 kg (137 lb)     Physical Exam  JVP not elevated. Carotid impulses are 2+ without overlying bruit.   Chest exhibits fair to good air movement with completely clear breath sounds.   The cardiac rhythm is regular with no premature beats.   Normal S1 and S2. No gallop, murmur or rub, or click.   Abdomen is soft and benign without focal tenderness.   With no lower leg edema. The pedal pulses are intact.   "   Last Labs:  Admission on 01/17/2024, Discharged on 01/17/2024   Component Date Value    Ventricular Rate 01/17/2024 87     Atrial Rate 01/17/2024 87     NE Interval 01/17/2024 122     QRS Duration 01/17/2024 96     QT Interval 01/17/2024 338     QTC Calculation(Bazett) 01/17/2024 406     P Reynolds 01/17/2024 74     R Axis 01/17/2024 60     T Axis 01/17/2024 39     QRS Count 01/17/2024 14     Q Onset 01/17/2024 217     P Onset 01/17/2024 156     P Offset 01/17/2024 206     T Offset 01/17/2024 386     QTC Fredericia 01/17/2024 382     WBC 01/17/2024 5.3     nRBC 01/17/2024 0.0     RBC 01/17/2024 5.99 (H)     Hemoglobin 01/17/2024 18.0 (H)     Hematocrit 01/17/2024 51.9     MCV 01/17/2024 87     MCH 01/17/2024 30.1     MCHC 01/17/2024 34.7     RDW 01/17/2024 12.6     Platelets 01/17/2024 221     Neutrophils % 01/17/2024 63.6     Immature Granulocytes %,* 01/17/2024 0.4     Lymphocytes % 01/17/2024 27.3     Monocytes % 01/17/2024 6.6     Eosinophils % 01/17/2024 1.5     Basophils % 01/17/2024 0.6     Neutrophils Absolute 01/17/2024 3.36     Immature Granulocytes Ab* 01/17/2024 0.02     Lymphocytes Absolute 01/17/2024 1.44     Monocytes Absolute 01/17/2024 0.35     Eosinophils Absolute 01/17/2024 0.08     Basophils Absolute 01/17/2024 0.03     Glucose 01/17/2024 106 (H)     Sodium 01/17/2024 138     Potassium 01/17/2024 4.0     Chloride 01/17/2024 97     Bicarbonate 01/17/2024 28     Urea Nitrogen 01/17/2024 20     Creatinine 01/17/2024 0.90     eGFR 01/17/2024 >90     Calcium 01/17/2024 10.7 (H)     Albumin 01/17/2024 5.6 (H)     Alkaline Phosphatase 01/17/2024 89     Total Protein 01/17/2024 8.6 (H)     AST 01/17/2024 18     Bilirubin, Total 01/17/2024 0.7     ALT 01/17/2024 19     Anion Gap 01/17/2024 13     Troponin T, High Sensiti* 01/17/2024 6     Troponin T, High Sensiti* 01/17/2024 6     Thyroid Stimulating Horm* 01/17/2024 1.70         Assessment/Plan   1.?  Hypertension.  This patient has been told to have  possible situational hypertension.  Medications have been considered but not instituted.  The patient was instructed by primary care to check home blood pressure readings which apparently are in the range of 120-130/70-80 mmHg.  Blood pressure is elevated today.  His EKG demonstrates sinus rhythm and voltage criteria for left ventricular hypertrophy.  The patient will be scheduled for an echocardiogram at time of next visit in 4 months to assess for possible evidence of hypertensive heart disease/LVH that would indicate a need to treat his blood pressure.  Will recheck blood pressure at that time.  He will be instructed to monitor home blood pressure readings and bring them into the next office visit along with his actual blood pressure machine.  2.  Nondiabetic.  3.  Negligible hyperlipidemia.  Lipid panel 6/17/2022 includes cholesterol 171 HDL 54  triglyceride 42.  He should not require treatment for his lipids unless he has an elevated CT calcium score.  4.  Non-smoker.  5.  Family history of heart disease.  Paternal and maternal grandfathers evidently had heart issues open heart surgery.  Mother has low back pain father has hypertension and borderline diabetes.  2 sisters 1 brother with are alive and well.  6.  Atypical chest pain.  Patient has occasional episodes of chest pressure shortness of breath and soreness.  The recurred intermittently over the past 1 year but relatively infrequently.  The duration can be up to 1 and half days.  Clinically suspect his symptoms are noncardiac but he was scheduled for CT coronary calcium score.  7.  Chronic anxiety.  8.  Bronchospastic airway disease.  9.  Status post excision of melanoma from the scalp.        Orders:  Orders Placed This Encounter   Procedures    CT cardiac scoring wo IV contrast    ECG 12 lead (Clinic Performed)    Transthoracic echo (TTE) complete      Followup Appts:  Future Appointments   Date Time Provider Department Center   6/21/2024  8:15  AM Ty Matt MD [id number] [facility]   9/20/2024  3:00 PM Ty Matt MD [id number] [facility]   10/9/2024  3:45 PM Jesse Lepe MD [id number] [facility]           ____________________________________________________________  Jesse Lepe MD  [facility] [place]  Assistant Clinical Professor, [facility], [employer]  [facility]

## 2024-06-06 ENCOUNTER — APPOINTMENT (OUTPATIENT)
Dept: CARDIOLOGY | Facility: HOSPITAL | Age: 44
End: 2024-06-06
Payer: COMMERCIAL

## 2024-06-21 ENCOUNTER — TELEMEDICINE (OUTPATIENT)
Dept: PRIMARY CARE | Facility: CLINIC | Age: 44
End: 2024-06-21
Payer: COMMERCIAL

## 2024-06-21 DIAGNOSIS — F41.9 ANXIETY: Primary | ICD-10-CM

## 2024-06-21 PROCEDURE — 99441 PR PHYS/QHP TELEPHONE EVALUATION 5-10 MIN: CPT | Performed by: STUDENT IN AN ORGANIZED HEALTH CARE EDUCATION/TRAINING PROGRAM

## 2024-06-21 PROCEDURE — 1036F TOBACCO NON-USER: CPT | Performed by: STUDENT IN AN ORGANIZED HEALTH CARE EDUCATION/TRAINING PROGRAM

## 2024-06-21 ASSESSMENT — PATIENT HEALTH QUESTIONNAIRE - PHQ9
2. FEELING DOWN, DEPRESSED OR HOPELESS: NOT AT ALL
SUM OF ALL RESPONSES TO PHQ9 QUESTIONS 1 AND 2: 0
1. LITTLE INTEREST OR PLEASURE IN DOING THINGS: NOT AT ALL

## 2024-06-21 ASSESSMENT — ENCOUNTER SYMPTOMS
GASTROINTESTINAL NEGATIVE: 1
CARDIOVASCULAR NEGATIVE: 1
RESPIRATORY NEGATIVE: 1
CONSTITUTIONAL NEGATIVE: 1

## 2024-06-21 NOTE — PROGRESS NOTES
[facility]: [facility]  TELEHEALTH ENCOUNTER      Aquilino Reyna is a 43 y.o. male that is presenting today for Follow-up.  This is a telehealth encounter with audio technology. Patient has consented to this type of visit. Duration of encounter: 5 minutes.    Assessment/Plan   Diagnoses and all orders for this visit:  Anxiety    Patient notes that he was starting to experience some benefits on the sertraline, specifically increased motivation; however, towards the end, the patient was beginning to notice palpitations that he was not comfortable with. These resolved shortly after the patient stopped the medication. Discussed different options with him moving forward, and based on our discussion, the patient is not interested in pursuing further therapy at this time. Encouraged the patient to reach back out to us if he has a change of heart. Patient agreeable to this plan.    Current Outpatient Medications   Medication Instructions    albuterol (Ventolin HFA) 90 mcg/actuation inhaler 2 puffs, inhalation, Every 4 hours PRN    LORazepam (ATIVAN) 0.5 mg, oral, Every 6 hours PRN    multivitamin tablet 1 tablet, oral, Daily     Subjective   - The patient otherwise feels well and denies any acute symptoms or concerns at this time.  - The patient denies any changes or progression of their chronic medical problems.  - The patient denies any problems or concerns with their medications.      Review of Systems   Constitutional: Negative.    Respiratory: Negative.     Cardiovascular: Negative.    Gastrointestinal: Negative.    All other systems reviewed and are negative.     Objective   There were no vitals filed for this visit.  There is no height or weight on file to calculate BMI.  Physical Exam  Vitals (Patient-reported vitals.) reviewed.   Constitutional:       Comments: This is a virtual / telehealth encounter; unable to perform physical exam.       Diagnostic Results   Lab Results   Component Value  "Date    GLUCOSE 106 (H) 01/17/2024    CALCIUM 10.7 (H) 01/17/2024     01/17/2024    K 4.0 01/17/2024    CO2 28 01/17/2024    CL 97 01/17/2024    BUN 20 01/17/2024    CREATININE 0.90 01/17/2024     Lab Results   Component Value Date    ALT 19 01/17/2024    AST 18 01/17/2024    ALKPHOS 89 01/17/2024    BILITOT 0.7 01/17/2024     Lab Results   Component Value Date    WBC 5.3 01/17/2024    HGB 18.0 (H) 01/17/2024    HCT 51.9 01/17/2024    MCV 87 01/17/2024     01/17/2024     Lab Results   Component Value Date    CHOL 171 06/17/2022     Lab Results   Component Value Date    HDL 54 06/17/2022     Lab Results   Component Value Date    LDLCALC 109 06/17/2022     Lab Results   Component Value Date    TRIG 42 06/17/2022     No components found for: \"CHOLHDL\"  Lab Results   Component Value Date    HGBA1C 5.0 06/17/2022     Other labs not included in the list above were reviewed either before or during this encounter.    History   History reviewed. No pertinent past medical history.  History reviewed. No pertinent surgical history.  No family history on file.  Social History     Socioeconomic History    Marital status:      Spouse name: Not on file    Number of children: Not on file    Years of education: Not on file    Highest education level: Not on file   Occupational History    Not on file   Tobacco Use    Smoking status: Never     Passive exposure: Never    Smokeless tobacco: Never   Vaping Use    Vaping status: Never Used   Substance and Sexual Activity    Alcohol use: Yes    Drug use: Never    Sexual activity: Not on file   Other Topics Concern    Not on file   Social History Narrative    Not on file     Social Determinants of Health     Financial Resource Strain: Not on file   Food Insecurity: Not on file   Transportation Needs: Not on file   Physical Activity: Not on file   Stress: Not on file   Social Connections: Not on file   Intimate Partner Violence: Not on file   Housing Stability: Not on file "     Allergies   Allergen Reactions    Horse Dander Unknown    House Dust Unknown     Current Outpatient Medications on File Prior to Visit   Medication Sig Dispense Refill    albuterol (Ventolin HFA) 90 mcg/actuation inhaler Inhale 2 puffs every 4 hours if needed for wheezing or shortness of breath. 8 g 1    LORazepam (Ativan) 0.5 mg tablet Take 1 tablet (0.5 mg) by mouth every 6 hours if needed for anxiety.      multivitamin tablet Take 1 tablet by mouth once daily.      [DISCONTINUED] sertraline (Zoloft) 25 mg tablet Take 1 tablet (25 mg) by mouth once daily. (Patient not taking: Reported on 6/4/2024) 30 tablet 1     No current facility-administered medications on file prior to visit.     Immunization History   Administered Date(s) Administered    Influenza, Unspecified 01/12/2011    Influenza, seasonal, injectable 01/15/2013    Pfizer Purple Cap SARS-CoV-2 12/31/2021     Patient's medical history was reviewed and updated either before or during this encounter.       Ty Matt MD

## 2024-06-24 ENCOUNTER — HOSPITAL ENCOUNTER (OUTPATIENT)
Dept: CARDIOLOGY | Facility: CLINIC | Age: 44
Discharge: HOME | End: 2024-06-24
Payer: COMMERCIAL

## 2024-06-24 DIAGNOSIS — I51.7 LVH (LEFT VENTRICULAR HYPERTROPHY): ICD-10-CM

## 2024-06-24 DIAGNOSIS — R00.2 PALPITATIONS: ICD-10-CM

## 2024-06-24 PROCEDURE — 93306 TTE W/DOPPLER COMPLETE: CPT | Performed by: INTERNAL MEDICINE

## 2024-06-24 PROCEDURE — 93306 TTE W/DOPPLER COMPLETE: CPT

## 2024-06-25 LAB
AORTIC VALVE PEAK VELOCITY: 1.58 M/S
AV PEAK GRADIENT: 9.9 MMHG
AVA (PEAK VEL): 2.71 CM2
EJECTION FRACTION APICAL 4 CHAMBER: 69.1
EJECTION FRACTION: 68 %
LEFT ATRIUM VOLUME AREA LENGTH INDEX BSA: 21.1 ML/M2
LEFT VENTRICLE INTERNAL DIMENSION DIASTOLE: 4.63 CM (ref 3.5–6)
LEFT VENTRICULAR OUTFLOW TRACT DIAMETER: 1.94 CM
MITRAL VALVE E/A RATIO: 1.64
RIGHT VENTRICLE FREE WALL PEAK S': 14.3 CM/S
TRICUSPID ANNULAR PLANE SYSTOLIC EXCURSION: 2.3 CM

## 2024-09-13 ENCOUNTER — HOSPITAL ENCOUNTER (OUTPATIENT)
Dept: RADIOLOGY | Facility: CLINIC | Age: 44
End: 2024-09-13
Payer: COMMERCIAL

## 2024-09-20 ENCOUNTER — APPOINTMENT (OUTPATIENT)
Dept: PRIMARY CARE | Facility: CLINIC | Age: 44
End: 2024-09-20
Payer: COMMERCIAL

## 2024-10-09 ENCOUNTER — APPOINTMENT (OUTPATIENT)
Dept: CARDIOLOGY | Facility: CLINIC | Age: 44
End: 2024-10-09
Payer: COMMERCIAL

## 2024-10-23 ENCOUNTER — APPOINTMENT (OUTPATIENT)
Dept: CARDIOLOGY | Facility: CLINIC | Age: 44
End: 2024-10-23
Payer: COMMERCIAL

## 2024-11-22 ENCOUNTER — HOSPITAL ENCOUNTER (OUTPATIENT)
Dept: RADIOLOGY | Facility: CLINIC | Age: 44
Discharge: HOME | End: 2024-11-22
Payer: COMMERCIAL

## 2024-11-22 DIAGNOSIS — R00.2 PALPITATIONS: ICD-10-CM

## 2024-11-22 DIAGNOSIS — I51.7 LVH (LEFT VENTRICULAR HYPERTROPHY): ICD-10-CM

## 2024-11-22 PROCEDURE — 75571 CT HRT W/O DYE W/CA TEST: CPT

## 2025-01-21 ENCOUNTER — APPOINTMENT (OUTPATIENT)
Dept: CARDIOLOGY | Facility: CLINIC | Age: 45
End: 2025-01-21
Payer: COMMERCIAL

## 2025-01-28 ENCOUNTER — APPOINTMENT (OUTPATIENT)
Dept: CARDIOLOGY | Facility: CLINIC | Age: 45
End: 2025-01-28
Payer: COMMERCIAL

## 2025-05-01 ENCOUNTER — OFFICE VISIT (OUTPATIENT)
Dept: CARDIOLOGY | Facility: CLINIC | Age: 45
End: 2025-05-01
Payer: COMMERCIAL

## 2025-05-01 VITALS
HEART RATE: 76 BPM | BODY MASS INDEX: 24.59 KG/M2 | SYSTOLIC BLOOD PRESSURE: 140 MMHG | OXYGEN SATURATION: 100 % | HEIGHT: 66 IN | DIASTOLIC BLOOD PRESSURE: 88 MMHG | WEIGHT: 153 LBS

## 2025-05-01 DIAGNOSIS — I10 ESSENTIAL HYPERTENSION: Primary | ICD-10-CM

## 2025-05-01 PROCEDURE — 3008F BODY MASS INDEX DOCD: CPT | Performed by: INTERNAL MEDICINE

## 2025-05-01 PROCEDURE — 3079F DIAST BP 80-89 MM HG: CPT | Performed by: INTERNAL MEDICINE

## 2025-05-01 PROCEDURE — 3077F SYST BP >= 140 MM HG: CPT | Performed by: INTERNAL MEDICINE

## 2025-05-01 PROCEDURE — 1036F TOBACCO NON-USER: CPT | Performed by: INTERNAL MEDICINE

## 2025-05-01 PROCEDURE — 99213 OFFICE O/P EST LOW 20 MIN: CPT | Performed by: INTERNAL MEDICINE

## 2025-05-01 ASSESSMENT — PATIENT HEALTH QUESTIONNAIRE - PHQ9
1. LITTLE INTEREST OR PLEASURE IN DOING THINGS: NOT AT ALL
SUM OF ALL RESPONSES TO PHQ9 QUESTIONS 1 AND 2: 0
2. FEELING DOWN, DEPRESSED OR HOPELESS: NOT AT ALL

## 2025-05-01 ASSESSMENT — ENCOUNTER SYMPTOMS: DEPRESSION: 0

## 2025-05-01 NOTE — PROGRESS NOTES
Primary Care Physician: Ty Matt MD  Date of Visit: 05/01/2025  4:00 PM EDT  Location of visit: [address]     Chief Complaint:   No chief complaint on file.    HPI / Summary:   Aquilino Reyna is a 44 y.o. male presents for atypical chest pain    ROS    Medical History:   He has no past medical history on file.  Surgical Hx:   He has no past surgical history on file.   Social Hx:   He reports that he has never smoked. He has never been exposed to tobacco smoke. He has never used smokeless tobacco. He reports current alcohol use. He reports that he does not use drugs.  Family Hx:   His family history is not on file.   Allergies:  Allergies   Allergen Reactions    Horse Dander Unknown    House Dust Unknown     Outpatient Medications:  Current Outpatient Medications   Medication Instructions    albuterol (Ventolin HFA) 90 mcg/actuation inhaler 2 puffs, inhalation, Every 4 hours PRN    LORazepam (ATIVAN) 0.5 mg, oral, Every 6 hours PRN    multivitamin tablet 1 tablet, oral, Daily     Physical Exam:  There were no vitals filed for this visit.    Wt Readings from Last 5 Encounters:   08/06/24 61.2 kg (134 lb 14.7 oz)   06/04/24 62.6 kg (138 lb 1.6 oz)   03/08/24 63.3 kg (139 lb 8 oz)   01/17/24 63.5 kg (139 lb 15.9 oz)   01/17/24 64.2 kg (141 lb 8.6 oz)     Physical Exam  JVP not elevated. Carotid impulses are 2+ without overlying bruit.   Chest exhibits fair to good air movement with completely clear breath sounds.   The cardiac rhythm is regular with no premature beats.   Normal S1 and S2. No gallop, murmur or rub, or click.   Abdomen is soft and benign without focal tenderness.   With no lower leg edema. The pedal pulses are intact.     Last Labs:  No visits with results within 6 Month(s) from this visit.   Latest known visit with results is:   Hospital Outpatient Visit on 06/24/2024   Component Date Value    AV pk iram 06/24/2024 1.58     LVOT diam 06/24/2024 1.94     MV E/A ratio 06/24/2024 1.64     LA vol  index A/L 06/24/2024 21.1     Tricuspid annular plane * 06/24/2024 2.3     LV EF 06/24/2024 68     RV free wall pk S' 06/24/2024 14.30     LVIDd 06/24/2024 4.63     Aortic Valve Area by Con* 06/24/2024 2.71     AV pk grad 06/24/2024 9.9     LV A4C EF 06/24/2024 69.1         Assessment/Plan   1.?  Hypertension.  This patient has been told to have possible situational hypertension.  Medications have been considered but not instituted.  The patient was instructed by primary care to check home blood pressure readings which apparently are in the range of 120-130/70-80 mmHg.  Blood pressure is elevated today.  His EKG demonstrates sinus rhythm and voltage criteria for left ventricular hypertrophy.  The patient will be scheduled for an echocardiogram at time of next visit in 4 months to assess for possible evidence of hypertensive heart disease/LVH that would indicate a need to treat his blood pressure.  Will recheck blood pressure at that time.  He will be instructed to monitor home blood pressure readings and bring them into the next office visit along with his actual blood pressure machine.  The patient did have echocardiogram performed 6/24/2024 demonstrating a high normal LV ejection fraction of 65-70%.  There was no evidence of left ventricular hypertrophy to suggest hypertensive heart disease.  His home blood pressure readings are ranging between 120-130/76-85 mmHg.  Office readings are slightly higher but at this time suspect that he does not require treatment.  Return to office in 6 months with additional home blood pressure readings and home blood pressure cuff.  2.  Nondiabetic.  3.  Negligible hyperlipidemia.  Lipid panel 6/17/2022 includes cholesterol 171 HDL 54  triglyceride 42.  He should not require treatment for his lipids unless he has an elevated CT calcium score.  4.  Non-smoker.  5.  Family history of heart disease.  Paternal and maternal grandfathers evidently had heart issues open heart  surgery.  Mother has low back pain father has hypertension and borderline diabetes.  2 sisters 1 brother with are alive and well.  6.  Atypical chest pain.  Patient has occasional episodes of chest pressure shortness of breath and soreness.  The recurred intermittently over the past 1 year but relatively infrequently.  The duration can be up to 1 and half days.  Clinically suspect his symptoms are noncardiac but he was scheduled for CT coronary calcium score.  Patient had a CT coronary calcium score of only 0 when performed on 11/22/2024.  He has some rare atypical left pectoral pain with lasting several seconds clinically noncardiac.  Would suggest a repeat CT coronary calcium score at the age of 52.  7.  Chronic anxiety.  8.  Bronchospastic airway disease.  9.  Status post excision of melanoma from the scalp.        Orders:  No orders of the defined types were placed in this encounter.     Followup Appts:  Future Appointments   Date Time Provider Department Center   5/1/2025  4:00 PM Jesse Lepe MD [id number]           ____________________________________________________________  Jesse Lepe MD  [facility]  Assistant Clinical Professor, [employer]  [facility]